# Patient Record
Sex: FEMALE | Race: OTHER | NOT HISPANIC OR LATINO | Employment: UNEMPLOYED | URBAN - METROPOLITAN AREA
[De-identification: names, ages, dates, MRNs, and addresses within clinical notes are randomized per-mention and may not be internally consistent; named-entity substitution may affect disease eponyms.]

---

## 2022-01-11 ENCOUNTER — OFFICE VISIT (OUTPATIENT)
Dept: URGENT CARE | Facility: CLINIC | Age: 37
End: 2022-01-11

## 2022-01-11 VITALS
HEIGHT: 67 IN | WEIGHT: 150 LBS | BODY MASS INDEX: 23.54 KG/M2 | HEART RATE: 99 BPM | RESPIRATION RATE: 18 BRPM | OXYGEN SATURATION: 98 % | TEMPERATURE: 98.3 F

## 2022-01-11 DIAGNOSIS — J32.0 MAXILLARY SINUSITIS, UNSPECIFIED CHRONICITY: Primary | ICD-10-CM

## 2022-01-11 PROCEDURE — U0005 INFEC AGEN DETEC AMPLI PROBE: HCPCS | Performed by: PHYSICIAN ASSISTANT

## 2022-01-11 PROCEDURE — G0382 LEV 3 HOSP TYPE B ED VISIT: HCPCS | Performed by: PHYSICIAN ASSISTANT

## 2022-01-11 PROCEDURE — U0003 INFECTIOUS AGENT DETECTION BY NUCLEIC ACID (DNA OR RNA); SEVERE ACUTE RESPIRATORY SYNDROME CORONAVIRUS 2 (SARS-COV-2) (CORONAVIRUS DISEASE [COVID-19]), AMPLIFIED PROBE TECHNIQUE, MAKING USE OF HIGH THROUGHPUT TECHNOLOGIES AS DESCRIBED BY CMS-2020-01-R: HCPCS | Performed by: PHYSICIAN ASSISTANT

## 2022-01-11 RX ORDER — AMOXICILLIN 500 MG/1
500 CAPSULE ORAL EVERY 12 HOURS SCHEDULED
Qty: 14 CAPSULE | Refills: 0 | Status: SHIPPED | OUTPATIENT
Start: 2022-01-11 | End: 2022-01-18

## 2022-01-11 NOTE — PROGRESS NOTES
[facility]        NAME: Kimberlee Schroeder is a 31 y o  female  : 1985    MRN: [id number]  DATE: 2022  TIME: 3:04 PM    Assessment and Plan   Maxillary sinusitis, unspecified chronicity [J32 0]  1  Maxillary sinusitis, unspecified chronicity  COVID Only -Office Collect    amoxicillin (AMOXIL) 500 mg capsule         Patient Instructions   Today you were tested for COVID-19 and influenza  Results will return approximately 24-48 hours  The fastest way to receive results is to download the [facility] on your phone or great account on a computer  If you view your results on [employer], we will not call you  If you do not see results on [employer], we will call you if your positive or negative  WE CANNOT PRINT YOUR TEST RESULTS FROM THE URGENT CARE  This is against a law called HIPAA  You may print results from your MyChart (IT help [phone] option 5) or call medical records at [phone]  Prophylactically self quarantine  Department of health's newest recommendations as of  state the following:    IF you TEST POSITIVE for COVID-19  -stay home for 5 days  If you have no symptoms or your symptoms are resolving after 5 days, you can leave your house  Continue to wear a mask around others for 5 additional days  If you have a fever, continue to stay home until you fever resolves  IF YOU WERE EXPOSED TO SOMEONE WITH COVID 19  -if you have been boosted OR completed the primary series of Pfizer or Moderna vaccine within the last 6 months OR completed the primary series of J&J vaccine within the last 2 months, wear a mask around others for 10 days  Get tested on day 5 if possible    If you develop symptoms, get a test and stay home     -if you completed the primary series of Pfizer or Moderna vaccine over 6 months ago and are NOT boosted OR completed the primary series of J&J over 2 months ago and are NOT boosted OR are unvaccinated, stay home for 5 days   After that continue to wear a mask around others for 5 additional days  If you can not quarantine you must wear a mask for 10 days  Test on day 5 if possible  If you develops symptoms get a test and stay home  Drink lots of fluids to maintain hydration  Do not touch your face, wash hands often, and practice social distancing  There is no treatment for simple outpatient COVID-19 patients however, CDC recommends 2000 units vitamin D3 to boost the immune system  Those with severe illness, older age, or multiple comorbidities may qualify for monoclonal antibody infusions as treatment  Please call your doctor to see if you qualify  Call your family doctor to have a follow-up appointment in next few days  Go to ER if he began experiencing chest pain, shortness of breath, fever that is not responding to antipyretics or other severe symptoms  Follow up with PCP in 3-5 days  Proceed to  ER if symptoms worsen  Chief Complaint     Chief Complaint   Patient presents with    COVID-19     congestion and cough x 2 months          History of Present Illness       Patient is a 82-year-old female with no significant past medical history is currently 12 weeks pregnant presents the office complaining of congestion and cough for 2 months with worsening symptoms over last few days  She denies fever, chills, sore throat, SOB, CP, difficulty breathing, loss of smell or taste, or nausea, vomiting  Denies any known exposure to COVID-19  Denies prior COVID-19 infection  Patient states her congestion and cough for consistent with prior pregnancies but states the got a lot worse over last few days with purulent drainage  Review of Systems   Review of Systems   Constitutional: Negative for chills and fever  HENT: Positive for congestion  Negative for sore throat  Respiratory: Positive for cough  Negative for shortness of breath  Cardiovascular: Negative for chest pain and palpitations  Gastrointestinal: Negative for abdominal pain, diarrhea, nausea and vomiting  Musculoskeletal: Negative for myalgias  Neurological: Negative for dizziness, light-headedness and headaches  Current Medications       Current Outpatient Medications:     Prenatal MV-Min-Fe Fum-FA-DHA (PRENATAL 1 PO), Take by mouth, Disp: , Rfl:     amoxicillin (AMOXIL) 500 mg capsule, Take 1 capsule (500 mg total) by mouth every 12 (twelve) hours for 7 days, Disp: 14 capsule, Rfl: 0    Current Allergies     Allergies as of 01/11/2022    (No Known Allergies)            The following portions of the patient's history were reviewed and updated as appropriate: allergies, current medications, past family history, past medical history, past social history, past surgical history and problem list      Past Medical History:   Diagnosis Date    Known health problems: none        Past Surgical History:   Procedure Laterality Date    NO PAST SURGERIES         Family History   Problem Relation Age of Onset    No Known Problems Mother     No Known Problems Father          Medications have been verified  Objective   Pulse 99   Temp 98 3 °F (36 8 °C)   Resp 18   Ht 5' 7" (1 702 m)   Wt 68 kg (150 lb)   LMP 10/15/2021 (Exact Date)   SpO2 98%   BMI 23 49 kg/m²   Patient's last menstrual period was 10/15/2021 (exact date)  Physical Exam     Physical Exam  Vitals and nursing note reviewed  Constitutional:       Appearance: Normal appearance  She is well-developed  HENT:      Head: Normocephalic and atraumatic  Right Ear: Tympanic membrane, ear canal and external ear normal       Left Ear: Tympanic membrane, ear canal and external ear normal       Nose: Congestion and rhinorrhea present  Mouth/Throat:      Pharynx: Uvula midline  Eyes:      General: Lids are normal       Conjunctiva/sclera: Conjunctivae normal       Pupils: Pupils are equal, round, and reactive to light     Cardiovascular:      Rate and Rhythm: Normal rate and regular rhythm  Pulses: Normal pulses  Heart sounds: Normal heart sounds  No murmur heard  No friction rub  No gallop  Pulmonary:      Effort: Pulmonary effort is normal       Breath sounds: Normal breath sounds  No wheezing, rhonchi or rales  Abdominal:      General: Bowel sounds are normal       Palpations: Abdomen is soft  Tenderness: There is no abdominal tenderness  Musculoskeletal:         General: Normal range of motion  Cervical back: Neck supple  Lymphadenopathy:      Cervical: No cervical adenopathy  Skin:     General: Skin is warm and dry  Capillary Refill: Capillary refill takes less than 2 seconds  Neurological:      Mental Status: She is alert

## 2022-01-11 NOTE — LETTER
[facility] [place]  [address] [place] [address]  Dept: [phone]    January 11, 2022    Patient: Bandar Tavera  YOB: 1985    Bandar Tavera was seen and evaluated at our [facility]  Please note if Covid and Flu tests are negative, they may return to work when fever free for 24 hours without the use of a fever reducing agent  If Covid or Flu test is positive, they may return to work on 1/13/2022, as this is 5 days from the onset of symptoms  Upon return, they must then adhere to strict masking for an additional 5 days      Sincerely,    Jackie Hood PA-C

## 2022-01-11 NOTE — PATIENT INSTRUCTIONS
Today you were tested for COVID-19 and influenza  Results will return approximately 24-48 hours  The fastest way to receive results is to download the [facility] on your phone or great account on a computer  If you view your results on [employer], we will not call you  If you do not see results on [employer], we will call you if your positive or negative  WE CANNOT PRINT YOUR TEST RESULTS FROM THE URGENT CARE  This is against a law called HIPAA  You may print results from your MyChart (IT help [phone] option 5) or call medical records at [phone]  Prophylactically self quarantine  Department of health's newest recommendations as of December 27,2021 state the following:    IF you TEST POSITIVE for COVID-19  -stay home for 5 days  If you have no symptoms or your symptoms are resolving after 5 days, you can leave your house  Continue to wear a mask around others for 5 additional days  If you have a fever, continue to stay home until you fever resolves  IF YOU WERE EXPOSED TO SOMEONE WITH COVID 19  -if you have been boosted OR completed the primary series of Pfizer or Moderna vaccine within the last 6 months OR completed the primary series of J&J vaccine within the last 2 months, wear a mask around others for 10 days  Get tested on day 5 if possible  If you develop symptoms, get a test and stay home     -if you completed the primary series of Pfizer or Moderna vaccine over 6 months ago and are NOT boosted OR completed the primary series of J&J over 2 months ago and are NOT boosted OR are unvaccinated, stay home for 5 days  After that continue to wear a mask around others for 5 additional days  If you can not quarantine you must wear a mask for 10 days  Test on day 5 if possible  If you develops symptoms get a test and stay home  Drink lots of fluids to maintain hydration  Do not touch your face, wash hands often, and practice social distancing     There is no treatment for simple outpatient COVID-19 patients however, CDC recommends 2000 units vitamin D3 to boost the immune system  Those with severe illness, older age, or multiple comorbidities may qualify for monoclonal antibody infusions as treatment  Please call your doctor to see if you qualify  Call your family doctor to have a follow-up appointment in next few days  Go to ER if he began experiencing chest pain, shortness of breath, fever that is not responding to antipyretics or other severe symptoms

## 2022-01-13 LAB — SARS-COV-2 RNA RESP QL NAA+PROBE: NEGATIVE
